# Patient Record
Sex: MALE | Race: WHITE | Employment: STUDENT | URBAN - METROPOLITAN AREA
[De-identification: names, ages, dates, MRNs, and addresses within clinical notes are randomized per-mention and may not be internally consistent; named-entity substitution may affect disease eponyms.]

---
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
## 2025-01-01 ENCOUNTER — HOSPITAL ENCOUNTER (EMERGENCY)
Age: 20
Discharge: HOME | End: 2025-01-02
Attending: EMERGENCY MEDICINE

## 2025-01-01 ENCOUNTER — APPOINTMENT (OUTPATIENT)
Dept: GENERAL RADIOLOGY | Age: 20
End: 2025-01-01
Attending: PHYSICIAN ASSISTANT

## 2025-01-01 DIAGNOSIS — J10.1 INFLUENZA A: Primary | ICD-10-CM

## 2025-01-01 PROCEDURE — 71046 X-RAY EXAM CHEST 2 VIEWS: CPT | Performed by: PHYSICIAN ASSISTANT

## 2025-01-01 PROCEDURE — 99284 EMERGENCY DEPT VISIT MOD MDM: CPT

## 2025-01-01 PROCEDURE — 87502 INFLUENZA DNA AMP PROBE: CPT | Performed by: PHYSICIAN ASSISTANT

## 2025-01-02 VITALS
BODY MASS INDEX: 18.9 KG/M2 | RESPIRATION RATE: 16 BRPM | TEMPERATURE: 103 F | WEIGHT: 135 LBS | OXYGEN SATURATION: 98 % | SYSTOLIC BLOOD PRESSURE: 108 MMHG | HEART RATE: 112 BPM | DIASTOLIC BLOOD PRESSURE: 62 MMHG | HEIGHT: 71 IN

## 2025-01-02 LAB
POCT INFLUENZA A: POSITIVE
POCT INFLUENZA B: NEGATIVE
SARS-COV-2 RNA RESP QL NAA+PROBE: NOT DETECTED

## 2025-01-02 RX ORDER — BENZONATATE 100 MG/1
100 CAPSULE ORAL 3 TIMES DAILY PRN
Qty: 30 CAPSULE | Refills: 0 | Status: SHIPPED | OUTPATIENT
Start: 2025-01-02 | End: 2025-02-01

## 2025-01-02 RX ORDER — IBUPROFEN 600 MG/1
600 TABLET, FILM COATED ORAL ONCE
Status: COMPLETED | OUTPATIENT
Start: 2025-01-02 | End: 2025-01-02

## 2025-01-02 NOTE — ED INITIAL ASSESSMENT (HPI)
Pt states sick for a bout a month. On and off fevers and congestion. States cough started yesterday.

## 2025-01-02 NOTE — ED PROVIDER NOTES
Patient Seen in: [facility] In [place]      History     Chief Complaint   Patient presents with    Cough/URI    Fever     Stated Complaint: cough, fever    Subjective:   HPI      19-year-old male presents with cough, fever, sore throat, general fatigue with bodyaches.  Symptoms began a day ago.  Arrives febrile at 102.5 °F.  Denies vomiting or diarrhea.  Reports pain through the mid chest and throat area with coughing.  Does describe some rhinorrhea and postnasal drip.  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 No pertinent past medical history.           History reviewed. No pertinent surgical history.             Social History     Socioeconomic History    Marital status: Single   Tobacco Use    Smoking status: Never    Smokeless tobacco: Never   Vaping Use    Vaping status: Former   Substance and Sexual Activity    Alcohol use: Never    Drug use: Never                  Physical Exam     ED Triage Vitals   BP 01/01/25 2323 112/68   Pulse 01/01/25 2323 118   Resp 01/01/25 2323 16   Temp 01/01/25 2323 100.4 °F (38 °C)   Temp src 01/01/25 2323 Oral   SpO2 01/01/25 2323 98 %   O2 Device 01/02/25 0008 None (Room air)       Current Vitals:   Vital Signs  BP: 108/62  Pulse: 112  Resp: 16  Temp: (!) 102.5 °F (39.2 °C)  Temp src: Oral    Oxygen Therapy  SpO2: 98 %  O2 Device: None (Room air)        Physical Exam  General:  Vitals as listed.  Appears flulike  HEENT: Posterior pharynx shows some cobblestoning consistent with PND.  No swelling or exudate.  Neck: supple, no rigidity without palpable lymphadenopathy  Lungs: good air exchange and clear   Heart: regular rate rhythm and no murmur   Extremities: no edema  Neuro: Alert oriented and nonfocal   Skin: no rashes or nodules    ED Course     Labs Reviewed   POCT FLU TEST - Abnormal; Notable for the following components:       Result Value    POCT INFLUENZA A Positive (*)      All other components within normal limits    Narrative:     This assay is a rapid molecular in vitro test utilizing nucleic acid amplification of influenza A and B viral RNA.   RAPID SARS-COV-2 BY PCR - Normal            XR CHEST PA + LAT CHEST (CPT=71046)    Result Date: 1/1/2025  PROCEDURE:  XR CHEST PA + LAT CHEST (CPT=71046)  INDICATIONS:  cough, fever  COMPARISON:  [facility], XR, CHEST PA   LATERAL, 12/21/2011, 6:21 PM.  TECHNIQUE:  PA and lateral chest radiographs were obtained.  PATIENT STATED HISTORY: (As transcribed by Technologist)     FINDINGS:  Cardiac silhouette and pulmonary vasculature within normal limits. No focal consolidation, pneumothorax or pleural effusion.            CONCLUSION:  No focal consolidation.   LOCATION:  [place]   Dictated by (CST): Sol Garza MD on 1/01/2025 at 11:58 PM     Finalized by (CST): Sol Garza MD on 1/01/2025 at 11:58 PM             MDM      19-year-old male presents with 2 days of bodyaches, fevers, congestion, cough.    Additional history obtained by mom who reports that she is concerned about a sore throat and wondering if it could be strep    Differential includes but is not limited to viral URI, bacterial pneumonia, a life threat.    Viral nasal swab, chest ordered for further evaluation.    My independent interpretation of chest x-ray is that there is no large focal pneumonia.    Viral nasal swab returned positive for influenza A.  This correlates with the patient's symptoms.  His lungs are clear.  No hypoxemia.  No GI symptoms.  Discussed Tamiflu and they declined.  Discussed fever control.  Reinforced handwashing and keeping away from vulnerable population.  Should not return to work until 24 hours fever free without medication.  Should continue monitoring symptoms and return if worsening or any concerns            Medical Decision Making      Disposition and Plan     Clinical Impression:  1. Influenza A         Disposition:  Discharge  1/2/2025 12:43  am    Follow-up:  Wesley Menendez MD  [address]  [facility] [address]  [phone]    Call            Medications Prescribed:  Current Discharge Medication List        START taking these medications    Details   benzonatate 100 MG Oral Cap Take 1 capsule (100 mg total) by mouth 3 (three) times daily as needed for cough.  Qty: 30 capsule, Refills: 0                 Supplementary Documentation:

## (undated) NOTE — ED AVS SNAPSHOT
[facility] in [address] [place] [address]    Phone:  [phone]    Fax:  Fady Zaman 11   MRN: [id number]    Department:  [facility] in [place]   Date of Visit: IF THERE IS ANY CHANGE OR WORSENING OF YOUR CONDITION, CALL YOUR PRIMARY CARE PHYSICIAN AT ONCE OR RETURN IMMEDIATELY TO THE EMERGENCY DEPARTMENT.     If you have been prescribed any medication(s), please fill your prescription right away and begin taking t

## (undated) NOTE — ED AVS SNAPSHOT
[facility] in [address] [facility]    Phone:  [phone]    Fax:  Fady Mendoza 11   MRN: [id number]    Department:  [facility] in [place]   Date of Visit: IF THERE IS ANY CHANGE OR WORSENING OF YOUR CONDITION, CALL YOUR PRIMARY CARE PHYSICIAN AT ONCE OR RETURN IMMEDIATELY TO THE EMERGENCY DEPARTMENT.     If you have been prescribed any medication(s), please fill your prescription right away and begin taking t

## (undated) NOTE — LETTER
January 24, 2017    Patient: Everardo Juarez   Date of Visit: 1/24/2017       To Whom It May Concern:    Ghassan Armstrong was seen and treated in our emergency department on 1/24/2017.  He can return to school with these limitations: limited use left hand unti

## (undated) NOTE — ED AVS SNAPSHOT
THE [facility] Emergency Department in [address] [facility]    Phone:  [phone]    Fax:  Fady Osorio   MRN: [id number]    Department:  THE [facility] Emergency Department in [place]   Date of Visit: If you have any problems with your follow-up, please call our  at [phone]    Si usted tiene algun problema con todd sequimiento, por favor llame a nuestro adminstrador de casos al [phone]    Expect to receive an electronic request Lakisha Norwood [address] [facility] ([address]) [address] [facility]. ([address]) [address]  ([address]) [address] [facility] Sign Up Forms link in the Additional Information box on the right. [employer] Questions? Call [phone] for help. [employer] is NOT to be used for urgent needs. For medical emergencies, dial 911.

## (undated) NOTE — ED AVS SNAPSHOT
THE [facility] Department in [address] [facility]    Phone:  [phone]    Fax:  Fady Osorio   MRN: [id number]    Department:  THE [facility] Emergency Department in [facility]   Date of Visit: Or call [phone]    If you have any problems with your follow-up, please call our  at [phone]    Si usted tiene algun problema con todd sequimiento, por favor llame a nuestro adminstrador de casos al [phone]    Expect to Pharmacy Address Phone Number   [address] [facility] ([address] [facility]) [address] [facility] [address] [facility] [address] ([address]) [address]  ([address] [facility] [address] [address]) [phone] Narrative:    PROCEDURE:  XR HAND (MIN 3 VIEWS), LEFT (CPT=73130)     TECHNIQUE:  Three views were obtained. COMPARISON:  None.      INDICATIONS:  left hand injury     PATIENT STATED HISTORY:  Patient fell today and states he landed with the dors

## (undated) NOTE — ED AVS SNAPSHOT
Yarely Brown   MRN: [id number]    Department:  [facility] Emergency Department in [place]   Date of Visit:  10/21/2018           Disclosure     Insurance plans vary and the physician(s) referred by the ER may not be covered by your plan.  Please contact tell this physician (or your personal doctor if your instructions are to return to your personal doctor) about any new or lasting problems. The primary care or specialist physician will see patients referred from the [facility] Emergency Department.  Mckay Phillip

## (undated) NOTE — LETTER
Date & Time: 1/2/2025, 12:43 AM  Patient: Alec Villagomez  Encounter Provider(s):    Brannon Dempsey MD       To Whom It May Concern:    Alec Villagomez was seen and treated in our department on 1/1/2025. He should not return to work until fevers resolve .    If you have any questions or concerns, please do not hesitate to call.        _____________________________  Physician/APC Signature

## (undated) NOTE — LETTER
Date & Time: 2/15/2024, 12:50 PM  Patient: Alec Villagomez  Encounter Provider(s):    George Monroe APRN       To Whom It May Concern:    Alec Villagomez was seen and treated in our department on 2/15/2024. He may be excused from gym through Monday 2/19/24.    If you have any questions or concerns, please do not hesitate to call.        _____________________________  Physician/APC Signature